# Patient Record
Sex: FEMALE | Race: OTHER | Employment: FULL TIME | URBAN - METROPOLITAN AREA
[De-identification: names, ages, dates, MRNs, and addresses within clinical notes are randomized per-mention and may not be internally consistent; named-entity substitution may affect disease eponyms.]

---

## 2019-11-24 ENCOUNTER — APPOINTMENT (OUTPATIENT)
Dept: CT IMAGING | Age: 59
End: 2019-11-24
Attending: PHYSICIAN ASSISTANT
Payer: COMMERCIAL

## 2019-11-24 ENCOUNTER — HOSPITAL ENCOUNTER (EMERGENCY)
Age: 59
Discharge: HOME | End: 2019-11-24
Attending: EMERGENCY MEDICINE
Payer: COMMERCIAL

## 2019-11-24 VITALS
HEART RATE: 77 BPM | DIASTOLIC BLOOD PRESSURE: 73 MMHG | RESPIRATION RATE: 14 BRPM | OXYGEN SATURATION: 100 % | WEIGHT: 148 LBS | SYSTOLIC BLOOD PRESSURE: 131 MMHG | TEMPERATURE: 98.7 F | HEIGHT: 64 IN | BODY MASS INDEX: 25.27 KG/M2

## 2019-11-24 DIAGNOSIS — J36 PERITONSILLAR ABSCESS: Primary | ICD-10-CM

## 2019-11-24 DIAGNOSIS — D72.829 LEUKOCYTOSIS, UNSPECIFIED TYPE: ICD-10-CM

## 2019-11-24 LAB
ALBUMIN SERPL-MCNC: 3.6 G/DL (ref 3.4–5)
ALBUMIN/GLOB SERPL: 0.8 {RATIO} (ref 0.8–1.7)
ALP SERPL-CCNC: 72 U/L (ref 45–117)
ALT SERPL-CCNC: 47 U/L (ref 13–56)
ANION GAP SERPL CALC-SCNC: 7 MMOL/L (ref 3–18)
AST SERPL-CCNC: 42 U/L (ref 10–38)
BASOPHILS # BLD: 0 K/UL (ref 0–0.06)
BASOPHILS NFR BLD: 0 % (ref 0–3)
BILIRUB SERPL-MCNC: 0.7 MG/DL (ref 0.2–1)
BUN SERPL-MCNC: 6 MG/DL (ref 7–18)
BUN/CREAT SERPL: 9 (ref 12–20)
CALCIUM SERPL-MCNC: 8.2 MG/DL (ref 8.5–10.1)
CHLORIDE SERPL-SCNC: 107 MMOL/L (ref 100–111)
CO2 SERPL-SCNC: 24 MMOL/L (ref 21–32)
CREAT SERPL-MCNC: 0.65 MG/DL (ref 0.6–1.3)
DIFFERENTIAL METHOD BLD: ABNORMAL
EOSINOPHIL # BLD: 0 K/UL (ref 0–0.4)
EOSINOPHIL NFR BLD: 0 % (ref 0–5)
ERYTHROCYTE [DISTWIDTH] IN BLOOD BY AUTOMATED COUNT: 12.9 % (ref 11.6–14.5)
GLOBULIN SER CALC-MCNC: 4.3 G/DL (ref 2–4)
GLUCOSE SERPL-MCNC: 113 MG/DL (ref 74–99)
HCT VFR BLD AUTO: 38.9 % (ref 35–45)
HGB BLD-MCNC: 13.7 G/DL (ref 12–16)
LYMPHOCYTES # BLD: 1 K/UL (ref 0.8–3.5)
LYMPHOCYTES NFR BLD: 5 % (ref 20–51)
MCH RBC QN AUTO: 31.7 PG (ref 24–34)
MCHC RBC AUTO-ENTMCNC: 35.2 G/DL (ref 31–37)
MCV RBC AUTO: 90 FL (ref 74–97)
MONOCYTES # BLD: 1.4 K/UL (ref 0–1)
MONOCYTES NFR BLD: 7 % (ref 2–9)
NEUTS BAND NFR BLD MANUAL: 1 % (ref 0–5)
NEUTS SEG # BLD: 17.3 K/UL (ref 1.8–8)
NEUTS SEG NFR BLD: 87 % (ref 42–75)
PLATELET # BLD AUTO: 308 K/UL (ref 135–420)
PLATELET COMMENTS,PCOM: ABNORMAL
PMV BLD AUTO: 11.1 FL (ref 9.2–11.8)
POTASSIUM SERPL-SCNC: 3.3 MMOL/L (ref 3.5–5.5)
PROT SERPL-MCNC: 7.9 G/DL (ref 6.4–8.2)
RBC # BLD AUTO: 4.32 M/UL (ref 4.2–5.3)
RBC MORPH BLD: ABNORMAL
SODIUM SERPL-SCNC: 138 MMOL/L (ref 136–145)
WBC # BLD AUTO: 19.7 K/UL (ref 4.6–13.2)

## 2019-11-24 PROCEDURE — 74011636320 HC RX REV CODE- 636/320: Performed by: EMERGENCY MEDICINE

## 2019-11-24 PROCEDURE — 96366 THER/PROPH/DIAG IV INF ADDON: CPT

## 2019-11-24 PROCEDURE — 70491 CT SOFT TISSUE NECK W/DYE: CPT

## 2019-11-24 PROCEDURE — 74011000258 HC RX REV CODE- 258: Performed by: PHYSICIAN ASSISTANT

## 2019-11-24 PROCEDURE — 96375 TX/PRO/DX INJ NEW DRUG ADDON: CPT

## 2019-11-24 PROCEDURE — 80053 COMPREHEN METABOLIC PANEL: CPT

## 2019-11-24 PROCEDURE — 74011000250 HC RX REV CODE- 250: Performed by: PHYSICIAN ASSISTANT

## 2019-11-24 PROCEDURE — 99283 EMERGENCY DEPT VISIT LOW MDM: CPT

## 2019-11-24 PROCEDURE — 96365 THER/PROPH/DIAG IV INF INIT: CPT

## 2019-11-24 PROCEDURE — 85025 COMPLETE CBC W/AUTO DIFF WBC: CPT

## 2019-11-24 PROCEDURE — 74011000250 HC RX REV CODE- 250: Performed by: EMERGENCY MEDICINE

## 2019-11-24 PROCEDURE — 74011250636 HC RX REV CODE- 250/636: Performed by: PHYSICIAN ASSISTANT

## 2019-11-24 RX ORDER — KETOROLAC TROMETHAMINE 15 MG/ML
15 INJECTION, SOLUTION INTRAMUSCULAR; INTRAVENOUS
Status: COMPLETED | OUTPATIENT
Start: 2019-11-24 | End: 2019-11-24

## 2019-11-24 RX ORDER — CLINDAMYCIN HYDROCHLORIDE 300 MG/1
300 CAPSULE ORAL 4 TIMES DAILY
Qty: 28 CAP | Refills: 0 | Status: SHIPPED | OUTPATIENT
Start: 2019-11-24 | End: 2019-12-01

## 2019-11-24 RX ORDER — LIDOCAINE HYDROCHLORIDE AND EPINEPHRINE 20; 10 MG/ML; UG/ML
1.5 INJECTION, SOLUTION INFILTRATION; PERINEURAL ONCE
Status: COMPLETED | OUTPATIENT
Start: 2019-11-24 | End: 2019-11-24

## 2019-11-24 RX ORDER — OXYCODONE AND ACETAMINOPHEN 5; 325 MG/1; MG/1
1 TABLET ORAL
Qty: 6 TAB | Refills: 0 | Status: SHIPPED | OUTPATIENT
Start: 2019-11-24 | End: 2019-11-25

## 2019-11-24 RX ORDER — MORPHINE SULFATE 4 MG/ML
4 INJECTION, SOLUTION INTRAMUSCULAR; INTRAVENOUS
Status: COMPLETED | OUTPATIENT
Start: 2019-11-24 | End: 2019-11-24

## 2019-11-24 RX ORDER — DEXAMETHASONE SODIUM PHOSPHATE 4 MG/ML
6 INJECTION, SOLUTION INTRA-ARTICULAR; INTRALESIONAL; INTRAMUSCULAR; INTRAVENOUS; SOFT TISSUE
Status: COMPLETED | OUTPATIENT
Start: 2019-11-24 | End: 2019-11-24

## 2019-11-24 RX ADMIN — KETOROLAC TROMETHAMINE 15 MG: 15 INJECTION, SOLUTION INTRAMUSCULAR; INTRAVENOUS at 09:51

## 2019-11-24 RX ADMIN — LIDOCAINE HYDROCHLORIDE,EPINEPHRINE BITARTRATE 30 MG: 20; .01 INJECTION, SOLUTION INFILTRATION; PERINEURAL at 14:02

## 2019-11-24 RX ADMIN — SODIUM CHLORIDE 1000 ML: 900 INJECTION, SOLUTION INTRAVENOUS at 11:44

## 2019-11-24 RX ADMIN — IOPAMIDOL 80 ML: 612 INJECTION, SOLUTION INTRAVENOUS at 10:51

## 2019-11-24 RX ADMIN — MORPHINE SULFATE 4 MG: 4 INJECTION, SOLUTION INTRAMUSCULAR; INTRAVENOUS at 14:02

## 2019-11-24 RX ADMIN — CLINDAMYCIN 600 MG: 150 INJECTION, SOLUTION INTRAMUSCULAR; INTRAVENOUS at 12:06

## 2019-11-24 RX ADMIN — TOPICAL ANESTHETIC: 200 SPRAY DENTAL; PERIODONTAL at 14:02

## 2019-11-24 RX ADMIN — DEXAMETHASONE SODIUM PHOSPHATE 6 MG: 4 INJECTION, SOLUTION INTRAMUSCULAR; INTRAVENOUS at 08:41

## 2019-11-24 NOTE — DISCHARGE INSTRUCTIONS
Patient Education        Peritonsillar Abscess: Care Instructions  Your Care Instructions    A peritonsillar abscess is a collection of pus that forms in tissues around the tonsils. It can occur as a result of strep throat or another infection. An abscess can cause severe pain and make it very hard to swallow. You will need antibiotics. In some cases, your abscess will have been drained through a needle or small incision. You may have had a sedative to help you relax. You may be unsteady after having sedation. It can take a few hours for the medicine's effects to wear off. Common side effects of sedation include nausea, vomiting, and feeling sleepy or tired. The doctor has checked you carefully, but problems can develop later. If you notice any problems or new symptoms, get medical treatment right away. Follow-up care is a key part of your treatment and safety. Be sure to make and go to all appointments, and call your doctor if you are having problems. It's also a good idea to know your test results and keep a list of the medicines you take. How can you care for yourself at home? · If the doctor gave you a sedative:  ? For 24 hours, don't do anything that requires attention to detail. This includes going to work, making important decisions, or signing any legal documents. It takes time for the medicine's effects to completely wear off.  ? For your safety, do not drive or operate any machinery that could be dangerous. Wait until the medicine wears off and you can think clearly and react easily. · Take your antibiotics as directed. Do not stop taking them just because you feel better. You need to take the full course of antibiotics. · Take pain medicines exactly as directed. ? If the doctor gave you a prescription medicine for pain, take it as prescribed.   ? If you are not taking a prescription pain medicine, ask your doctor if you can take an over-the-counter medicine, such as acetaminophen (Tylenol), ibuprofen (Advil, Motrin), or naproxen (Aleve). Read and follow all instructions on the label. ? Do not take two or more pain medicines at the same time unless the doctor told you to. Many pain medicines have acetaminophen, which is Tylenol. Too much acetaminophen (Tylenol) can be harmful. · Gargle with warm salt water once an hour to help reduce swelling and relieve discomfort. Use 1 teaspoon of salt mixed in 8 fluid ounces of warm water. · Get lots of rest.  · Follow your doctor's instructions if your abscess was drained through a needle or small incision. · While your throat is very sore, use liquid nourishment such as soup or high-protein drinks. · Prevent spreading an infection. Wash your hands often, do not sneeze or cough on others, and do not share toothbrushes, eating utensils, or drinking glasses. When should you call for help? Call 911 anytime you think you may need emergency care. For example, call if:    · You have trouble breathing.     · You passed out (lost consciousness).     · You have a lot of blood coming from the mouth.    Call your doctor now or seek immediate medical care if:    · You have new or worse symptoms of infection, such as:  ? Increased pain, swelling, warmth, or redness. ? Red streaks coming from the area. ? Pus draining from the area. ? A fever.     · You are bleeding.     · You have new or worse nausea or vomiting.     · You have new or worse trouble swallowing.     · You have a hard time drinking fluids.    Watch closely for changes in your health, and be sure to contact your doctor if:    · You do not get better as expected. Where can you learn more? Go to http://harlan-emily.info/. Enter U623 in the search box to learn more about \"Peritonsillar Abscess: Care Instructions. \"  Current as of: October 21, 2018  Content Version: 12.2  © 6471-6237 [employer].  Care instructions adapted under license by [employer] (which disclaims liability or warranty for this information). If you have questions about a medical condition or this instruction, always ask your healthcare professional. [address] any warranty or liability for your use of this information.

## 2019-11-24 NOTE — ED PROVIDER NOTES
EMERGENCY DEPARTMENT HISTORY AND PHYSICAL EXAM    Date: 11/24/2019  Patient Name: Nita Shook    History of Presenting Illness     Chief Complaint   Patient presents with    Sore Throat     History Provided By: patient  Chief Complaint: sore throat  Duration: 3 days  Timing: Constant, worsening  Location: Right-sided neck/throat  Quality: Ache  Severity: 10 out of 10  Modifying Factors: Diagnosed with tonsillitis yesterday: Started on Z-Nickolas  Associated Symptoms: none     Additional History (Context): Nita Shook is a 63 y.o. female with a history of asthma who presents to the emergency department with right-sided neck/throat pain persisting for the past 3 days. Patient states that [facility] and was diagnosed with tonsillitis however her symptoms have worsened despite 3 doses of the Z-Nickolas she was prescribed. Patient describes her pain as an ache that is a 10 out of 10 in severity. She has been taking Tylenol and ibuprofen regularly for her symptoms. She notes associated fever/chills, right ear pain, right-sided headache. PCP: Other, MD Sophia    Current Facility-Administered Medications   Medication Dose Route Frequency Provider Last Rate Last Dose    lidocaine-EPINEPHrine (XYLOCAINE) 2 %-1:100,000 injection 30 mg  1.5 mL SubCUTAneous ONCE Lauren Cuellar MD        benzocaine (HURRICAINE) 20 % spray   Mucous Membrane PRN Lauren Cuellar MD        morphine injection 4 mg  4 mg IntraVENous NOW [place]         Current Outpatient Medications   Medication Sig Dispense Refill    clindamycin (CLEOCIN) 300 mg capsule Take 1 Cap by mouth four (4) times daily for 7 days. 28 Cap 0    oxyCODONE-acetaminophen (PERCOCET) 5-325 mg per tablet Take 1 Tab by mouth every eight (8) hours as needed for Pain for up to 6 doses. Max Daily Amount: 3 Tabs. 6 Tab 0    trimethoprim-sulfamethoxazole (BACTRIM DS) 160-800 mg per tablet Take 1 tablet by mouth two (2) times a day.  escitalopram oxalate (LEXAPRO) 10 mg tablet Take 10 mg by mouth daily.  montelukast (SINGULAIR) 10 mg tablet Take 10 mg by mouth daily.  Cetirizine (ZYRTEC) 10 mg cap Take  by mouth.  fluticasone (FLONASE) 50 mcg/actuation nasal spray 2 sprays by Both Nostrils route daily.  albuterol (PROVENTIL HFA, VENTOLIN HFA, PROAIR HFA) 90 mcg/actuation inhaler Take  by inhalation.  estrogen, conjugated,-medroxyPROGESTERone (PREMPRO) 0.3-1.5 mg per tablet Take 1 tablet by mouth daily.  magic mouthwash (FIRST-MOUTHWASH BLM) 948--40 mg/30 mL mwsh oral suspension Take 10 mL by mouth every four (4) hours as needed for Stomatitis. 240 mL 0       Past History     Past Medical History:  Past Medical History:   Diagnosis Date    Asthma     UTI (lower urinary tract infection)      Past Surgical History:  Past Surgical History:   Procedure Laterality Date    HX ORTHOPAEDIC      orif left clavicle     Family History:  History reviewed. No pertinent family history. Social History:  Social History     Tobacco Use    Smoking status: Never Smoker    Smokeless tobacco: Never Used   Substance Use Topics    Alcohol use: Yes     Comment: social    Drug use: No     Allergies: Allergies   Allergen Reactions    Ancef [Cefazolin] Anaphylaxis    Aspirin Anaphylaxis    Penicillins Anaphylaxis       Review of Systems   Review of Systems   Constitutional: Positive for chills and fever. HENT: Positive for drooling, ear pain (Right-sided), sore throat (Right-sided) and trouble swallowing. Negative for congestion. Eyes: Negative for pain and redness. Respiratory: Negative for cough and shortness of breath. Cardiovascular: Negative for chest pain and palpitations. Gastrointestinal: Negative for abdominal pain, nausea and vomiting. Genitourinary: Negative for dysuria and hematuria. Musculoskeletal: Positive for neck pain (Right sided). Negative for back pain.    Skin: Negative for rash and wound.   Neurological: Positive for headaches (Right sided). Negative for syncope. All other systems reviewed and are negative. All Other Systems Negative  Physical Exam     Vitals:    11/24/19 0803 11/24/19 0846   BP: 131/73    Pulse: 77    Resp: 14    Temp: 98.7 °F (37.1 °C)    SpO2: 100% 100%   Weight: 67.1 kg (148 lb)    Height: 5' 4\" (1.626 m)      Physical Exam  Vitals signs and nursing note reviewed. Constitutional:       General: She is in acute distress. Appearance: She is well-developed. Comments: Patient appears to be in acute pain. Afebrile. HENT:      Head: Normocephalic and atraumatic. Right Ear: Tympanic membrane normal.      Left Ear: Tympanic membrane normal.      Nose: No congestion. Mouth/Throat:      Mouth: Mucous membranes are moist.      Comments: Unable to perform complete oropharynx exam as patient is in too much pain to open her mouth fully despite using a tongue depressor - positive trismus. There is pharyngeal erythema noted. Unable to visualize tonsils or uvula. Eyes:      Conjunctiva/sclera: Conjunctivae normal.      Pupils: Pupils are equal, round, and reactive to light. Neck:      Musculoskeletal: Normal range of motion and neck supple. Comments: No anterior cervical lymphadenopathy noted however patient is tender to palpation over the right cervical area. Cardiovascular:      Rate and Rhythm: Normal rate and regular rhythm. Heart sounds: Normal heart sounds. No murmur. No friction rub. No gallop. Pulmonary:      Effort: Pulmonary effort is normal. No respiratory distress. Breath sounds: Normal breath sounds. No wheezing. Skin:     General: Skin is warm and dry. Neurological:      General: No focal deficit present. Mental Status: She is alert and oriented to person, place, and time.    Psychiatric:         Mood and Affect: Mood normal.         Behavior: Behavior normal.         Diagnostic Study Results     Labs -     Recent Results (from the past 12 hour(s))   CBC WITH AUTOMATED DIFF    Collection Time: 11/24/19  8:42 AM   Result Value Ref Range    WBC 19.7 (H) 4.6 - 13.2 K/uL    RBC 4.32 4.20 - 5.30 M/uL    HGB 13.7 12.0 - 16.0 g/dL    HCT 38.9 35.0 - 45.0 %    MCV 90.0 74.0 - 97.0 FL    MCH 31.7 24.0 - 34.0 PG    MCHC 35.2 31.0 - 37.0 g/dL    RDW 12.9 11.6 - 14.5 %    PLATELET 306 043 - 769 K/uL    MPV 11.1 9.2 - 11.8 FL    NEUTROPHILS 87 (H) 42 - 75 %    BAND NEUTROPHILS 1 0 - 5 %    LYMPHOCYTES 5 (L) 20 - 51 %    MONOCYTES 7 2 - 9 %    EOSINOPHILS 0 0 - 5 %    BASOPHILS 0 0 - 3 %    ABS. NEUTROPHILS 17.3 (H) 1.8 - 8.0 K/UL    ABS. LYMPHOCYTES 1.0 0.8 - 3.5 K/UL    ABS. MONOCYTES 1.4 (H) 0 - 1.0 K/UL    ABS. EOSINOPHILS 0.0 0.0 - 0.4 K/UL    ABS. BASOPHILS 0.0 0.0 - 0.06 K/UL    DF MANUAL      PLATELET COMMENTS ADEQUATE PLATELETS      RBC COMMENTS NORMOCYTIC, NORMOCHROMIC     METABOLIC PANEL, COMPREHENSIVE    Collection Time: 11/24/19  8:42 AM   Result Value Ref Range    Sodium 138 136 - 145 mmol/L    Potassium 3.3 (L) 3.5 - 5.5 mmol/L    Chloride 107 100 - 111 mmol/L    CO2 24 21 - 32 mmol/L    Anion gap 7 3.0 - 18 mmol/L    Glucose 113 (H) 74 - 99 mg/dL    BUN 6 (L) 7.0 - 18 MG/DL    Creatinine 0.65 0.6 - 1.3 MG/DL    BUN/Creatinine ratio 9 (L) 12 - 20      GFR est AA >60 >60 ml/min/1.73m2    GFR est non-AA >60 >60 ml/min/1.73m2    Calcium 8.2 (L) 8.5 - 10.1 MG/DL    Bilirubin, total 0.7 0.2 - 1.0 MG/DL    ALT (SGPT) 47 13 - 56 U/L    AST (SGOT) 42 (H) 10 - 38 U/L    Alk. phosphatase 72 45 - 117 U/L    Protein, total 7.9 6.4 - 8.2 g/dL    Albumin 3.6 3.4 - 5.0 g/dL    Globulin 4.3 (H) 2.0 - 4.0 g/dL    A-G Ratio 0.8 0.8 - 1.7         Radiologic Studies -   CT NECK SOFT TISSUE W CONT    (Results Pending)     CT Results  (Last 48 hours)    None        CXR Results  (Last 48 hours)    None        Medical Decision Making   I am the first provider for this patient.     I reviewed the vital signs, available nursing notes, past medical history, past surgical history, family history and social history. Vital Signs-Reviewed the patient's vital signs. Records Reviewed: Nursing Notes and Old Medical Records     Procedures: None     Provider Notes (Medical Decision Making): Patient is a 71-year-old female presenting with right-sided sore throat and neck pain persisting for the past 3 days. Was diagnosed with tonsillitis yesterday and started on a Z-Nickolas. Vital signs are stable. Exam is notable for inability to completely open the mouth due to pain. Was unable to perform full oropharyngeal exam due to this. Will order basic labs and obtain CT soft tissue of the neck with IV contrast to rule out any sort of abscess or epiglottitis. Will administer Decadron IV.    9:29 AM  Pt requesting pain medication. Will give toradol as pt states that she is able to tolerate NSAIDs despite her ASA allergy. 9:44 AM  WBC count of 19,000 noted. Pending CT results. Will give IV clindamycin. 11:21 AM  ED read of CT scan with Dr. Laurence Rush shows a possible right peritonsillar abscess. Will contact ENT. Pt states that she is feeling much better after the decadron and toradol. She appears more comfortable. Will give NS as pt just tld me that she has not eaten anything in 2 days. 11:34 AM  Spoke with Dr. Lita Morel who states that if official read of CT scan shows abscess then he will come in and see the patient. 1:07 PM  Still pending results of CT scan. Dr. Lita Morel has decided to come in and evaluate the patient. Patient states she is still currently feeling much better. Will continue to monitor and reevaluate. 1:52 PM  Peritonsillar abscess drained in the ED by Dr. Lita Morel. Will administer dose of morphine prior to discharge. We will send patient home with a prescription for clindamycin. Patient has been advised to return to the ED upon any severe worsening of symptoms including difficulty swallowing, shortness of breath, severe pain.   Will also provide follow-up with Dr. Lucia Zhao as an outpatient if her symptoms worsen. Patient has verbalized her agreement and understanding to this plan. Will instruct patient to take probiotics as well. MED RECONCILIATION:  Current Facility-Administered Medications   Medication Dose Route Frequency    lidocaine-EPINEPHrine (XYLOCAINE) 2 %-1:100,000 injection 30 mg  1.5 mL SubCUTAneous ONCE    benzocaine (HURRICAINE) 20 % spray   Mucous Membrane PRN    morphine injection 4 mg  4 mg IntraVENous NOW     Current Outpatient Medications   Medication Sig    clindamycin (CLEOCIN) 300 mg capsule Take 1 Cap by mouth four (4) times daily for 7 days.  oxyCODONE-acetaminophen (PERCOCET) 5-325 mg per tablet Take 1 Tab by mouth every eight (8) hours as needed for Pain for up to 6 doses. Max Daily Amount: 3 Tabs.  trimethoprim-sulfamethoxazole (BACTRIM DS) 160-800 mg per tablet Take 1 tablet by mouth two (2) times a day.  escitalopram oxalate (LEXAPRO) 10 mg tablet Take 10 mg by mouth daily.  montelukast (SINGULAIR) 10 mg tablet Take 10 mg by mouth daily.  Cetirizine (ZYRTEC) 10 mg cap Take  by mouth.  fluticasone (FLONASE) 50 mcg/actuation nasal spray 2 sprays by Both Nostrils route daily.  albuterol (PROVENTIL HFA, VENTOLIN HFA, PROAIR HFA) 90 mcg/actuation inhaler Take  by inhalation.  estrogen, conjugated,-medroxyPROGESTERone (PREMPRO) 0.3-1.5 mg per tablet Take 1 tablet by mouth daily.  magic mouthwash (FIRST-MOUTHWASH BLM) 741--40 mg/30 mL mwsh oral suspension Take 10 mL by mouth every four (4) hours as needed for Stomatitis. Disposition:  Home     DISCHARGE NOTE:   Pt has been reexamined. Patient has no new complaints, changes, or physical findings. Care plan outlined and precautions discussed. Results of workup were reviewed with the patient. All medications were reviewed with the patient. All of pt's questions and concerns were addressed.  Patient was instructed and agrees to follow up with PCP, ENT, as well as to return to the ED upon further deterioration. Patient is ready to go home. Follow-up Information     Follow up With Specialties Details Why Contact Info    Edilma Clifton MD Otolaryngology, Surgery In 2 days If symptoms worsen [address] [facility] [address]  [phone] [address] [facility] EMERGENCY DEPT Emergency Medicine  As needed, If symptoms worsen [address] [facility] [address]  [phone]    Your PCP              Current Discharge Medication List      START taking these medications    Details   clindamycin (CLEOCIN) 300 mg capsule Take 1 Cap by mouth four (4) times daily for 7 days. Qty: 28 Cap, Refills: 0      oxyCODONE-acetaminophen (PERCOCET) 5-325 mg per tablet Take 1 Tab by mouth every eight (8) hours as needed for Pain for up to 6 doses. Max Daily Amount: 3 Tabs. Qty: 6 Tab, Refills: 0    Associated Diagnoses: Peritonsillar abscess             Diagnosis     Clinical Impression:   1. Peritonsillar abscess    2.  Leukocytosis, unspecified type

## 2019-11-24 NOTE — LETTER
NOTIFICATION RETURN TO WORK / SCHOOL 
 
11/24/2019 2:04 PM 
 
Ms. Maria Alejandra Ricardo [address] [facility] [address] To Whom It May Concern: 
 
Maria Alejandra Ricardo is currently under the care of [facility] EM[facility]ENCY [facility]. She will return to work/school on: 11/27/19 Maria Alejandra Ricardo may return to work/school with the following restrictions: None If there are questions or concerns please have the patient contact our office.  
 
Sincerely, 
 
 
DAVID Moses

## 2019-11-24 NOTE — ED TRIAGE NOTES
The patient presents for evaluation of a sore throat that began two days ago and right ear ache that began yesterday. She went to [facility] yesterday and was diagnosed with tonsillitis. They prescribed her Zithromax and Lidocaine solution. She presents today because her symptoms have not improve.

## 2019-11-25 NOTE — CONSULTS
[address] [facility]    Name:  Rudi Dodge  MR#:   [id number]  :  1960  ACCOUNT #:  [de-identified]  DATE OF SERVICE:  2019    REASON FOR EVALUATION:  Right pharyngeal pain. HISTORY OF PRESENT ILLNESS:  The patient is a 72-year-old female who has a past medical history significant for asthma. The patient presented to the emergency room this a.m. with complaints of right-sided neck pain, throat pain, as well as right-sided otalgia, ongoing for the last three days. The patient was seen at [facility] within the last 24 hours with a diagnosis of tonsillitis and was given Zithromax without success. The patient states that her pain is 10/10 in severity. She has been taking Tylenol and ibuprofen. She has noted some significant fevers and chills, right ear pain, and right-sided headaches. The patient was seen by the ER personnel. CT scan was obtained. CT scan was reviewed by me and I have also reviewed it with Radiology. This revealed a right-sided peritonsillar abscess. The patient is noted to have a white count of 19 as well. The patient denies any previous history of this in the past.  She denies any other otolaryngologic complaints. No other significant findings are seen. PAST MEDICAL HISTORY:  Significant for reactive airway disease as well as UTI. PAST SURGICAL HISTORY:  Includes ORIF of the left clavicle. FAMILY HISTORY:  Noncontributory. SOCIAL HISTORY:  The patient denies any tobacco use. She does admit to social alcohol intake. ALLERGIES:  INCLUDE ANCEF, ASPIRIN, AND PENICILLIN. REVIEW OF SYSTEMS:  Contributory for significant odynophagia as well as right-sided drooling and dysphagia. Neck exam is significant for cervical adenopathy. Otologic complaint is significant for difficulties with right-sided ear pain. Remainder of her review of systems is noncontributory.     PHYSICAL EXAMINATION:  GENERAL:  Reveals a well-developed, well-nourished, pleasant 20-year-old female with a height of 5 feet 4 inches and weight of 148. VITAL SIGNS:  Respirations 17, pulse 77, blood pressure 130/80. HEENT:  Ear exam reveals some cerumen. The oral cavity and oropharynx reveal significant trismus. Visualization was performed with some visualization of the pharyngeal area. There does appear to be a right-sided peritonsillar abscess seen. Some uvular swelling is noted as well. The intranasal exam reveals no evidence of any mucopurulent discharge. NECK:  Reveals some significant tenderness along the right neck. Under local anesthesia, utilizing topical local as well as 1% lidocaine with epinephrine, the area was then injected and incision and drainage performed. A large abscess cavity was obtained. Significant purulence noted. The patient tolerated drainage quite well. IMPRESSION:  Peritonsillar abscess, right side. PLAN:  The patient may need pain control as well as clindamycin. I have discussed the situation at length with the patient. I have informed her that in approximately 20% of these cases, this may recur anywhere from within the next several days to within six weeks' time. If this is noted or she is having some increasing symptomatology, she may wish to follow up with an ENT. The patient is from [place] in the [place] area. I have recommended that she follow up with an ENT regardless. The patient will contact me if any difficulties arise while still in the area. Thank you for the consult.       Anitra Hilliard MD      [id number]  D:  11/24/2019 14:29  T:  11/24/2019 19:57  JOB #:  [id number]